# Patient Record
Sex: MALE | Employment: FULL TIME | URBAN - METROPOLITAN AREA
[De-identification: names, ages, dates, MRNs, and addresses within clinical notes are randomized per-mention and may not be internally consistent; named-entity substitution may affect disease eponyms.]

---

## 2020-06-05 ENCOUNTER — VIRTUAL VISIT (OUTPATIENT)
Dept: PHYSICAL THERAPY | Facility: CLINIC | Age: 25
End: 2020-06-05
Payer: OTHER MISCELLANEOUS

## 2020-06-05 DIAGNOSIS — M54.2 CERVICAL PAIN: ICD-10-CM

## 2020-06-05 PROCEDURE — 97112 NEUROMUSCULAR REEDUCATION: CPT | Mod: 95 | Performed by: PHYSICAL THERAPIST

## 2020-06-05 PROCEDURE — 97161 PT EVAL LOW COMPLEX 20 MIN: CPT | Mod: 95 | Performed by: PHYSICAL THERAPIST

## 2020-06-05 PROCEDURE — 97110 THERAPEUTIC EXERCISES: CPT | Mod: 95 | Performed by: PHYSICAL THERAPIST

## 2020-06-05 NOTE — PROGRESS NOTES
"Physical Therapy Virtual Initial Visit      The patient has been notified of following:     \"This virtual visit will be conducted between you and your provider. We have found that certain health care needs can be provided without the need for physical presence.  This service lets us provide the care you need with a virtual visit.\"    Due to external, as well as internal [facility] management of the COVID-19 Virus, [place] was not seen in our clinic.  As a substitution, we implemented a virtual visit to manage this patient's condition utilizing the [employer] virtual visit platform via the patient s existing code.  The provider, Semaj Stanton, reviewed the patient's chart, PTRx prescription, and spoke with the patient to determine the following telemedicine visit is appropriate and effective for the patient's care.    The following type of visit was completed:   Video Visit:  The [employer] platform uses a synchronous HIPAA compliant video stream for this patient encounter.         Subjective:    Therapist Generated HPI Evaluation  Problem details: Pt presents to PT with a chief complaint of lower cervical pain and stiffness w/ reported initial onset on 2/20/20 after a work related lifting injury in which patient suffered a C7-T1 spinous process fx. Pt was in a brace for 3 months and gradually weaned from the brace last week. Pt reports doing well but has pain with lifting especially overhead. .         Type of problem:  Cervical spine.    This is a new condition.  Condition occurred with:  Lifting.    Patient reports pain:  Lower cervical spine.    Pain radiates to:  Shoulder left and shoulder right.     Symptoms are exacerbated by carrying and lifting  and relieved by rest.          Patient Health History  Symptom: cervical pain.     Date of Onset: 2/20/20.   HPI: Documentation: lifting.   Pain is reported as 4/10 on pain scale.  General health as reported by patient is good.  Pertinent medical " history includes: none.   Red flags:  None as reported by patient.  Medical allergies: none.   Surgeries include:  None.    Current medications:  None.    Occupation: assembly.   Primary job tasks include:  Lifting/carrying and prolonged standing.                              Objective:    Standing Alignment:      Shoulder/UE:  Rounded shoulders                                Cervical/Thoracic Evaluation    AROM:  AROM Cervical:    Flexion:          Min loss, pain + R scapula  Extension:       WNL  Rotation:         Left: WNL     Right: Min loss, pain + R scapula  Side Bend:      Left:     Right:                                Shoulder Evaluation:  ROM:  AROM:  : WNL Shoulder AROM, min discomfort with shoulder abduction AROM                                                                            General   ROS    PTRx Content from today's visit:  Exercise Name: Levator Scapulae Stretch, Sets: 1 set - Reps: 10 sec holds, 6 reps - Sessions: 2x, Notes: Can use heat before for 10-15 min  Exercise Name: Scapular Retraction/Depression, Sets: 1 set - Reps: 5 sec holds, 10 reps  - Sessions: 2x per day  Exercise Name: Shoulder Scaption Full Can, Sets: 1-2 set  - Reps: 30 reps  - Sessions: 1x/day, Notes: progress to soup can at home  Exercise Name: Shoulder External Rotation Sidelying, Sets: 1-2 sets  - Reps: 30 reps  - Sessions: 1 x per day , Notes: Keep towel roll or pillow between arm and trunk;; Progress to 30 reps as tolerated w/ 10 oz soup can. Focus on controlled lowering motion  Exercise Name: Prone Scapula I, Sets: 1-2 sets - Reps: 30 reps - Sessions: 1x day    Assessment/Plan:     Patient is a 24 year old male with cervical complaints.    Patient has the following significant findings with corresponding treatment plan.                Diagnosis 1:  Cervical pain  Pain -  manual therapy, splint/taping/bracing/orthotics, self management and education  Decreased ROM/flexibility - manual therapy and therapeutic  exercise  Decreased strength - therapeutic exercise and therapeutic activities  Impaired muscle performance - neuro re-education    Therapy Evaluation Codes:   Cumulative Therapy Evaluation is: Low complexity.    Previous and current functional limitations:  (See Goal Flow Sheet for this information)    Short term and Long term goals: (See Goal Flow Sheet for this information)     Communication ability:  Patient appears to be able to clearly communicate and understand verbal and written communication and follow directions correctly.  Treatment Explanation - The following has been discussed with the patient:   RX ordered/plan of care  Anticipated outcomes  Possible risks and side effects  This patient would benefit from PT intervention to resume normal activities.   Rehab potential is good.    Frequency:  1 X week, once daily  Duration:  for 6 weeks  Discharge Plan:  Achieve all LTG.  Independent in home treatment program.  Reach maximal therapeutic benefit.    Please refer to the daily flowsheet for treatment today, total treatment time and time spent performing 1:1 timed codes.       Virtual visit contact time    Time of service began: 2:20 PM  Time of service ended: 3:00 PM  Total Time for set up, visit, and documentation: 50 minutes    Payor: WORK COMP / Plan: [facility] MUTUAL INSURANCE / Product Type: *No Product type* /     Procedure Code/s   Therapeutic Exercise (02568): 10 minutes  Neuromuscular Re-education (93013): 10 minutes    I have reviewed the note as documented above.  This accurately captures the substance of my conversation with the patient.  Provider location: [facility] (City/State)  Patient location: Home    ___________________________________________________

## 2020-06-16 ENCOUNTER — THERAPY VISIT (OUTPATIENT)
Dept: PHYSICAL THERAPY | Facility: CLINIC | Age: 25
End: 2020-06-16
Payer: OTHER MISCELLANEOUS

## 2020-06-16 DIAGNOSIS — M54.2 CERVICAL PAIN: ICD-10-CM

## 2020-06-16 PROCEDURE — 97112 NEUROMUSCULAR REEDUCATION: CPT | Mod: GP | Performed by: PHYSICAL THERAPIST

## 2020-06-16 PROCEDURE — 97110 THERAPEUTIC EXERCISES: CPT | Mod: GP | Performed by: PHYSICAL THERAPIST

## 2020-06-16 NOTE — PROGRESS NOTES
"Physical Therapy Virtual Follow Up Visit      The patient has been notified of following:     \"This virtual visit will be conducted between you and your provider. We have found that certain health care needs can be provided without the need for physical presence.  This service lets us provide the care you need with a virtual visit.\"    Due to external, as well as internal [facility] management of the COVID-19 Virus, [place] was not seen in our clinic.  As a substitution, we implemented a virtual visit to manage this patient's condition utilizing the [employer] virtual visit platform via the patient s existing code.  The provider, Semaj Stanton, reviewed the patient's chart, PTRx prescription, and spoke with the patient to determine the following telemedicine visit is appropriate and effective for the patient's care.    The following type of visit was completed:   Video Visit:  The [employer] platform uses a synchronous HIPAA compliant video stream for this patient encounter.        S: [place] Grove is a 24 year old male. Connected virtually on the PTRx platform to discuss their condition/progress. They noted improvements in shoulder/scapular pain. Pt very pleased with his progress over the last month  They noted ongoing soreness with looking down and to the left. Pt reports very mild soreness at R superior scapula, but that has slowly improved with stretching     Current pain level: 0/10      O: Patient demonstrated Min discomfort with cervical flexion at R superior scapula    Painfree WNL Shoulder AROM. Tolerated progression in strengthening     PTRx Content from today's visit:  Exercise Name: Levator Scapulae Stretch, Sets: 1 set - Reps: 10 sec holds, 6 reps - Sessions: 2x, Notes: Can use heat before for 10-15 min  Exercise Name: Shoulder Scaption Full Can, Sets: 2 - Reps: 30 reps  - Sessions: 3x week , Notes: progress weight as able   Exercise Name: Shoulder External Rotation " Sidelying, Sets: 2 sets  - Reps: 30 reps  - Sessions: 3x week , Notes: Progress weight as able   Exercise Name: Prone Scapula I, Sets: 2 sets - Reps: 30 reps - Sessions: 3x week   Exercise Name: Prone Scapula T, Sets: 2 - Reps: 30 reps (or until fatigue)  - Sessions: 3x week   Exercise Name: Elbow Strengthening Isotonic Flexion, Sets: 3 - Reps: 12 reps  - Sessions: 3x week   Exercise Name: Bent Over Tricep Extension, Sets: 3 - Reps: 15 reps  - Sessions: 3x week    A:   Patient is progressing as expected.  Response to therapy has shown an improvement in  pain level and ROM       P: Patient will continue with the exercise program assigned on their PTRx code and will add the following measures to manage their pain/condition: Scapular stabilization and UE strengthening     Current treatment program is being advanced to more complex exercises.      Virtual visit contact time    Time of service began: 3:45 PM  Time of service ended: 4:20 PM  Total Time for set up, visit, and documentation: 35 minutes    Payor: WORK COMP / Plan:  [employer]URANCE / Product Type: *No Product type* /   .  Procedure Code/s   Therapeutic Exercise (80210): 15 minutes  Neuromuscular Re-education (87769): 15 minutes    I have reviewed the note as documented above.  This accurately captures the substance of my conversation with the patient.  Provider location: [facility] ([facility]/State)  Patient location: Home

## 2021-01-15 ENCOUNTER — HEALTH MAINTENANCE LETTER (OUTPATIENT)
Age: 26
End: 2021-01-15

## 2021-02-09 PROBLEM — M54.2 CERVICAL PAIN: Status: RESOLVED | Noted: 2020-06-05 | Resolved: 2021-02-09

## 2021-04-26 ENCOUNTER — IMMUNIZATION (OUTPATIENT)
Dept: NURSING | Facility: CLINIC | Age: 26
End: 2021-04-26
Payer: COMMERCIAL

## 2021-04-26 PROCEDURE — 0001A PR COVID VAC PFIZER DIL RECON 30 MCG/0.3 ML IM: CPT

## 2021-04-26 PROCEDURE — 91300 PR COVID VAC PFIZER DIL RECON 30 MCG/0.3 ML IM: CPT

## 2021-05-17 ENCOUNTER — IMMUNIZATION (OUTPATIENT)
Dept: NURSING | Facility: CLINIC | Age: 26
End: 2021-05-17
Attending: INTERNAL MEDICINE
Payer: COMMERCIAL

## 2021-05-17 PROCEDURE — 91300 PR COVID VAC PFIZER DIL RECON 30 MCG/0.3 ML IM: CPT

## 2021-05-17 PROCEDURE — 0002A PR COVID VAC PFIZER DIL RECON 30 MCG/0.3 ML IM: CPT

## 2021-10-24 ENCOUNTER — HEALTH MAINTENANCE LETTER (OUTPATIENT)
Age: 26
End: 2021-10-24

## 2022-02-13 ENCOUNTER — HEALTH MAINTENANCE LETTER (OUTPATIENT)
Age: 27
End: 2022-02-13

## 2022-10-15 ENCOUNTER — HEALTH MAINTENANCE LETTER (OUTPATIENT)
Age: 27
End: 2022-10-15

## 2023-03-26 ENCOUNTER — HEALTH MAINTENANCE LETTER (OUTPATIENT)
Age: 28
End: 2023-03-26

## 2024-05-26 ENCOUNTER — HEALTH MAINTENANCE LETTER (OUTPATIENT)
Age: 29
End: 2024-05-26